# Patient Record
Sex: FEMALE | URBAN - METROPOLITAN AREA
[De-identification: names, ages, dates, MRNs, and addresses within clinical notes are randomized per-mention and may not be internally consistent; named-entity substitution may affect disease eponyms.]

---

## 2024-07-12 ENCOUNTER — OFFICE VISIT (OUTPATIENT)
Dept: URBAN - METROPOLITAN AREA CLINIC 107 | Facility: CLINIC | Age: 64
End: 2024-07-12

## 2024-07-12 NOTE — HPI-TODAY'S VISIT:
64-year-old female with a history of hyperlipidemia, CKD stage IIIa, vitamin D deficiency, hypertension is referred by Dr. Mathew Crenshaw for screening colonoscopy.  A copy of today's visit will be forwarded to the referring provider.  Labs 2/2/2024: Normal TSH, BUN 25, normal LFTs, triglycerides 366, HDL 82, normal H/H, platelet count 247, hemoglobin A1c 5.9.  She failed direct access colonoscopy questionnaire due to history of cardiac stents.  She was recently seen in the ED at [facility] on 6/10 with reports of elevated blood pressure.  Labs reveal normal CBC, BUN 25, normal creatinine, AST 63, ALT 59, alk phos 122, normal troponin, normal BNP, unremarkable urinalysis.  Chest x-ray was negative.  She had a PCP follow-up and no medications were changed.  She was to continue following with cardiology.